# Patient Record
Sex: MALE | Race: WHITE
[De-identification: names, ages, dates, MRNs, and addresses within clinical notes are randomized per-mention and may not be internally consistent; named-entity substitution may affect disease eponyms.]

---

## 2020-08-13 ENCOUNTER — HOSPITAL ENCOUNTER (EMERGENCY)
Dept: HOSPITAL 61 - ER | Age: 20
Discharge: HOME | End: 2020-08-13
Payer: SELF-PAY

## 2020-08-13 VITALS — BODY MASS INDEX: 23.66 KG/M2 | WEIGHT: 190.26 LBS | HEIGHT: 75 IN

## 2020-08-13 VITALS — DIASTOLIC BLOOD PRESSURE: 66 MMHG | SYSTOLIC BLOOD PRESSURE: 128 MMHG

## 2020-08-13 DIAGNOSIS — Z98.890: ICD-10-CM

## 2020-08-13 DIAGNOSIS — N34.2: Primary | ICD-10-CM

## 2020-08-13 DIAGNOSIS — R30.0: ICD-10-CM

## 2020-08-13 LAB
APTT PPP: YELLOW S
BACTERIA #/AREA URNS HPF: (no result) /HPF
BILIRUB UR QL STRIP: NEGATIVE
FIBRINOGEN PPP-MCNC: CLEAR MG/DL
NITRITE UR QL STRIP: NEGATIVE
PH UR STRIP: 7.5 [PH]
PROT UR STRIP-MCNC: NEGATIVE MG/DL
RBC #/AREA URNS HPF: (no result) /HPF (ref 0–2)
SQUAMOUS #/AREA URNS LPF: (no result) /LPF
UROBILINOGEN UR-MCNC: 1 MG/DL
WBC #/AREA URNS HPF: (no result) /HPF (ref 0–4)

## 2020-08-13 PROCEDURE — 81001 URINALYSIS AUTO W/SCOPE: CPT

## 2020-08-13 PROCEDURE — 87591 N.GONORRHOEAE DNA AMP PROB: CPT

## 2020-08-13 PROCEDURE — 96372 THER/PROPH/DIAG INJ SC/IM: CPT

## 2020-08-13 PROCEDURE — 99283 EMERGENCY DEPT VISIT LOW MDM: CPT

## 2020-08-13 PROCEDURE — 87491 CHLMYD TRACH DNA AMP PROBE: CPT

## 2020-08-13 NOTE — PHYS DOC
Past Medical History


Past Medical History:  No Pertinent History


Past Surgical History:  Other


Additional Past Surgical Histo:  BILATERAL LEG "RODS"


Smoking Status:  Never Smoker


Alcohol Use:  None





General Adult


EDM:


Chief Complaint:  PAIN ON URINATION





HPI:


HPI:





Patient is a 19  year old male who presents with dysuria. Patient states that he

used a port a potty and then 1 day later developed dysuria. Denies any other 

symptoms. Denies concern for STDs.





Review of Systems:


Review of Systems:


General: Denies fever, chills, sweats, fatigue


Eyes: Denies drainage, blurred vision, eye redness


HENT: Denies rhinorrhea, sore throat, earache


Respiratory: Denies cough, shortness of breath, wheezing


Cardiac: Denies edema, palpitations, chest pain


GI: Denies abdominal pain, Nausea, vomiting


MSK: Denies back pain, neck pain


Skin: Denies rash, jaundice


Neuro: Denies headache, dizziness


Psychiatric: Denies SI/HI





Heart Score:


Risk Factors:


Risk Factors:  DM, Current or recent (<one month) smoker, HTN, HLP, family histo

ry of CAD, obesity.


Risk Scores:


Score 0 - 3:  2.5% MACE over next 6 weeks - Discharge Home


Score 4 - 6:  20.3% MACE over next 6 weeks - Admit for Clinical Observation


Score 7 - 10:  72.7% MACE over next 6 weeks - Early Invasive Strategies





Allergies:


Allergies:





Allergies








Coded Allergies Type Severity Reaction Last Updated Verified


 


  No Known Drug Allergies    8/13/20 No











Physical Exam:


PE:


General: Awake, alert, NAD. Well Nourished, well hydrated. Cooperative


HEENT: Atraumatic, EOMI, PERRL, airway patent, moist oral mucosa


Neck: Supple, trachea midline


Respiratory: CTA bilaterally, normal effort, no wheezing/crackles


CV: RRR, no murmur, cap refill <2


GI: Soft, nondistended, nontender, no masses


MSK: No obvious deformities


Skin: Warm, dry, intact


Neuro: A&O x3, speech NL, sensory and motor grossly intact, no focal deficits


Psych: Normal affect, normal mood, not suicidal or homicidal





Current Patient Data:


Labs:





                                Laboratory Tests








Test


 8/13/20


10:04


 


Urine Collection Type Unknown  


 


Urine Color Yellow  


 


Urine Clarity Clear  


 


Urine pH


 7.5 (<5.0-8.0)





 


Urine Specific Gravity


 1.020


(1.000-1.030)


 


Urine Protein


 Negative mg/dL


(NEG-TRACE)


 


Urine Glucose (UA)


 Negative mg/dL


(NEG)


 


Urine Ketones (Stick)


 Negative mg/dL


(NEG)


 


Urine Blood


 Negative (NEG)





 


Urine Nitrite


 Negative (NEG)





 


Urine Bilirubin


 Negative (NEG)





 


Urine Urobilinogen Dipstick


 1.0 mg/dL (0.2


mg/dL)


 


Urine Leukocyte Esterase Trace (NEG)  


 


Urine RBC


 Rare /HPF


(0-2)


 


Urine WBC


 Rare /HPF


(0-4)


 


Urine Squamous Epithelial


Cells Few /LPF  





 


Urine Bacteria


 Few /HPF


(0-FEW)








Vital Signs:





                                   Vital Signs








  Date Time  Temp Pulse Resp B/P (MAP) Pulse Ox O2 Delivery O2 Flow Rate FiO2


 


8/13/20 10:35 98.1 55 20 126/60 (82) 98 Room Air  





 98.1       











EKG:


EKG:


[]





Radiology/Procedures:


Radiology/Procedures:


[]





Course & Med Decision Making:


Course & Med Decision Making


Pertinent Labs and Imaging studies reviewed. (See chart for details)





Patient is a 19 year old male who presents to the Emergency Room complaining of 

dysuria. Denies penile discharge. UA shows small amount of bacteria. I have 

discussed that in his age group this is more likely to be GC/Chal and patient 

stated understanding. Will treat for urethritis. I discussed with him we would 

get results back in the next couple of days and call him with results.





Dragon Disclaimer:


Dragon Disclaimer:


This electronic medical record was generated, in whole or in part, using a voice

 recognition dictation system.





Departure


Departure


Impression:  


   Primary Impression:  


   Urethritis


Disposition:  01 HOME, SELF-CARE


Condition:  STABLE


Referrals:  


EVANGELIST HONEYCUTT MD (PCP)


Patient Instructions:  Urethritis, Adult


Scripts


Nitrofurantoin Monohyd/M-Cryst (MACROBID 100 MG CAPSULE) 100 Mg Capsule


1 CAP PO BID for 5 Days, #10 CAP 0 Refills


   Prov: PAXTON HINTON MD         8/13/20





Justicifation of Admission Dx:


Justifications for Admission:


Justification of Admission Dx:  No











PAXTON HINTON MD              Aug 13, 2020 11:52

## 2022-05-23 ENCOUNTER — HOSPITAL ENCOUNTER (EMERGENCY)
Dept: HOSPITAL 61 - ER | Age: 22
Discharge: HOME | End: 2022-05-23
Payer: COMMERCIAL

## 2022-05-23 VITALS — BODY MASS INDEX: 24.67 KG/M2 | WEIGHT: 198.42 LBS | HEIGHT: 75 IN

## 2022-05-23 VITALS — SYSTOLIC BLOOD PRESSURE: 140 MMHG | DIASTOLIC BLOOD PRESSURE: 84 MMHG
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
## 2022-05-23 DIAGNOSIS — S82.52XA: Primary | ICD-10-CM
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
## 2022-05-23 PROCEDURE — 96372 THER/PROPH/DIAG INJ SC/IM: CPT

## 2022-05-23 PROCEDURE — 73610 X-RAY EXAM OF ANKLE: CPT

## 2022-05-23 PROCEDURE — 73110 X-RAY EXAM OF WRIST: CPT

## 2022-05-23 PROCEDURE — 99284 EMERGENCY DEPT VISIT MOD MDM: CPT

## 2022-05-23 NOTE — RAD
Exam: XR RT WRIST 3VIEWS



History: 3 views of the right wrist.



Comparison: None.



Findings:

Osseous mineralization is normal. No acute fracture or dislocation. Ulnar minus deviation. No degener
ative change. No focal soft tissue swelling.



Impression: 

1.  No acute osseous abnormality in the right wrist. Recommend repeat radiographs in 1-2 weeks to exc
lude occult fracture if there is persistent pain at the scaphoid.



Electronically signed by: Jonathan Pro MD (5/23/2022 1:07 PM) [id number]

## 2022-05-23 NOTE — RAD
XR EXAM OF ANKLE_LEFT 3V



History: Pain.



Comparison: None.



Findings:

Osseous mineralization is normal. There is an oblique fracture of the distal fibular metadiaphysis ex
tending to the level of the tibial plafond. Borderline widening of the medial clear space. The talar 
dome is intact. Lateral ankle soft tissue swelling.



Impression: 

1.  Oblique fracture of the distal fibular metadiaphysis extending to the level of the tibial plafond
. Possible medial clear space widening may represent syndesmotic injury.



Electronically signed by: Jonathan Pro MD (5/23/2022 1:08 PM) [id number]

## 2022-05-23 NOTE — PHYS DOC
Past Medical History


Past Medical History:  No Pertinent History


Additional Past Surgical Histo:  BILATERAL LEG "RODS"


Smoking Status:  Never Smoker


Alcohol Use:  None





General Adult


EDM:


Chief Complaint:  ANKLE PROBLEM





HPI:


HPI:





Patient is a 21 year old male who presents with left ankle and right wrist pain 

x1 week.  Patient was playing baseball last week when his pain began.  Another 

player hit the baseball with a bat in through the back, which then struck the 

medial aspect of his ankle.  Patient reports that he has been ambulating on it 

since injury, though painfully.  His wrist pain is mild and causes him minimal 

discomfort.  Patient has no other injuries or complaints at this time.





Review of Systems:


Review of Systems:


ROS negative or noncontributory except as mentioned in HPI.





Heart Score:


C/O Chest Pain:  No





Current Medications:





Current Medications








 Medications


  (Trade)  Dose


 Ordered  Sig/Yee  Start Time


 Stop Time Status Last Admin


Dose Admin


 


 Ketorolac


 Tromethamine


  (Toradol 30mg


 Vial)  30 mg  1X  ONCE  5/23/22 13:30


 5/23/22 13:31 DC 5/23/22 13:00


30 MG











Allergies:


Allergies:





Allergies








Coded Allergies Type Severity Reaction Last Updated Verified


 


  No Known Drug Allergies    8/13/20 No











Physical Exam:


PE:





Constitutional: Well developed, well nourished, no acute distress, non-toxic 

appearance. 


HENT: Normocephalic, atraumatic, bilateral external ears normal, oropharynx 

moist, no oral exudates, nose normal. 


Eyes: EOMI, conjunctiva normal, no discharge.  


Neck: Normal range of motion, no stridor. 


Skin: Warm, dry, no erythema, no rash.


Back: No tenderness, no CVA tenderness. 


Extremities: Left ankle with diffuse swelling and tenderness, DP/PT pulses 2+ 

and symmetrical, capillary refill less than 2 seconds, great toe dorsiflexion i

ntact.  Extremities otherwise no tenderness, no cyanosis, no clubbing, active 

and passive ROM intact.


Neurologic: Alert and oriented x4, normal motor function, normal sensory 

function, no focal deficits noted.





Current Patient Data:


Vital Signs:





                                   Vital Signs








  Date Time  Temp Pulse Resp B/P (MAP) Pulse Ox O2 Delivery O2 Flow Rate FiO2


 


5/23/22 12:04 98.0 78 20 140/84 (102) 98 Room Air  





 98.0       











Radiology/Procedures:


Radiology/Procedures:


PROCEDURE: WRIST 3V RIGHT





Exam: XR RT WRIST 3VIEWS





History: 3 views of the right wrist.





Comparison: None.





Findings:


Osseous mineralization is normal. No acute fracture or dislocation. Ulnar minus 

deviation. No degenerative change. No focal soft tissue swelling.





Impression: 


1.  No acute osseous abnormality in the right wrist. Recommend repeat radiog

raphs in 1-2 weeks to exclude occult fracture if there is persistent pain at the

scaphoid.





Electronically signed by: Jonathan Pro MD (5/23/2022 1:07 PM) [id number]





PROCEDURE: ANKLE LEFT 3V





XR EXAM OF ANKLE_LEFT 3V





History: Pain.





Comparison: None.





Findings:


Osseous mineralization is normal. There is an oblique fracture of the distal 

fibular metadiaphysis extending to the level of the tibial plafond. Borderline 

widening of the medial clear space. The talar dome is intact. Lateral ankle soft

tissue swelling.





Impression: 


1.  Oblique fracture of the distal fibular metadiaphysis extending to the level 

of the tibial plafond. Possible medial clear space widening may represent 

syndesmotic injury.





Electronically signed by: Jonathan Pro MD (5/23/2022 1:08 PM) [id number]





Course & Med Decision Making:


Course & Med Decision Making


Pertinent Labs and Imaging studies reviewed. (See chart for details)





Plain films reveal a medial malleoli are fracture on the left side.  It is 

somewhat displaced.  Dr. Wheeler, D.P.M., was consulted.  He does have availability

in office today for further evaluation and management of the injury.  Dr. Wheeler 

is considering surgical intervention because the patient is young and active.  

Patient was directed by ER staff directly to the outpatient office.





Dragon Disclaimer:


Dragon Disclaimer:


This electronic medical record was generated, in whole or in part, using a voice

recognition dictation system.





Departure


Departure


Impression:  


   Primary Impression:  


   Medial malleolar fracture


   Qualified Codes:  S82.52XA - Displaced fracture of medial malleolus of left 

   tibia, initial encounter for closed fracture


Disposition:  01 HOME / SELF CARE / HOMELESS


Condition:  STABLE


Referrals:  


EVANGELIST HONEYCUTT MD (PCP)








NANCY WHEELER DPCLAU


Patient Instructions:  Ankle Fracture, Easy-to-Read





Additional Instructions:  


Please proceed directly to Dr. Wheeler's office.





EMERGENCY DEPARTMENT GENERAL DISCHARGE INSTRUCTIONS





Thank you for coming to [facility] Emergency Department (ED) to

day and trusting us with you care.  We trust that you had a positive experience 

in our Emergency Department.  If you wish to speak to the department management,

you may call the director at [phone].





YOUR FOLLOW UP INSTRUCTIONS ARE AS FOLLOWS:


1.  Follow up with your primary care doctor. If you do not have a primary 

doctor, please ask for a resource list of physicians or clinics that may be able

to assist you with follow up care.


2.  The emergency provider has interpreted your imaging studies, if any were 

ordered.  The radiology imaging specialist also reviewed them.  If there is a 

change in the findings, you will be notified in 48 hours when at all possible.


3.  If a lab test or culture has been done, your results will be reviewed and 

you will be notified if you need a change in treatment.


4.  Follow instructions verbalized to you and refer to the printouts if needed.





ADDITIONAL INSTRUCTIONS AND INFORMATION:


1.  Your care today has been supervised by a physician who is specially trained 

in emergency care.  Many problems require more than one evaluation for a 

complete diagnosis and treatment.  We recommend that you schedule your follow up

appointment as recommended to ensure complete treatment of you illness or 

injury.  If you are unable to obtain follow up care and continue to have a 

problem, or if your condition worsens, we recommend that you return to the ED.


2.  We are not able to safely determine your condition over the phone nor are we

able to give sound medical advice over the phone.  For these safety reasons, if 

you call for medical advice we will ask you to come to the ED for further 

evaluation.


3.  If you have any questions regarding these discharge instructions please call

the ED at [phone].





SAFETY INFORMATION:


In the interest of safety, wellness, and injury prevention; we encourage you to 

wear your seat belt, if you smoke; quite smoking, and we encourage family to use

a protective helmet for bicycling and other sporting events that present an 

increased risk for head injury.





IF YOUR SYMPTOMS WORSEN OR NEW SYMPTOMS DEVELOP, OR YOU HAVE CONCERNS ABOUT YOUR

CONDITION; OR IF YOUR CONDITION WORSENS WHILE YOU ARE WAITING FOR YOUR FOLLOW UP

APPOINTMENT; EITHER CONTACT YOUR PRIMARY CARE DOCTOR, THE PHYSICIAN WHOSE NAME 

AND NUMBER YOU WERE GIVEN, OR RETURN TO THE ED IMMEDIATELY.











PREMA SCOTT            May 23, 2022 14:02